# Patient Record
Sex: FEMALE | URBAN - METROPOLITAN AREA
[De-identification: names, ages, dates, MRNs, and addresses within clinical notes are randomized per-mention and may not be internally consistent; named-entity substitution may affect disease eponyms.]

---

## 2022-03-13 ENCOUNTER — NURSE TRIAGE (OUTPATIENT)
Dept: ADMINISTRATIVE | Facility: CLINIC | Age: 37
End: 2022-03-13

## 2022-03-13 NOTE — TELEPHONE ENCOUNTER
"Pt calling that is 12 weeks pregnant and has a yeast infection wanting to know if can use monistat. Pts care advice is to see Md within three days in the office and pt said that she will call on Tues for and appt   Reason for Disposition   [1] Symptoms of a "yeast infection" (i.e., itchy, white discharge, not bad smelling) AND [2] not improved > 3 days following CARE ADVICE    Additional Information   Negative: [1] Pregnant 23 or more weeks AND [2] baby is moving less today (e.g., kick count < 5 in 1 hour or < 10 in 2 hours)   Negative: Patient sounds very sick or weak to the triager   Negative: [1] Constant abdominal pain AND [2] present > 2 hours   Negative: [1] Intermittent lower abdominal pain AND [2] present > 24 hours   Negative: [1] Pregnant 24-36 weeks () AND [2] pinkish or brownish mucous discharge   Negative: [1] Yellow or green vaginal discharge AND [2] fever   Negative: Painful rash with tiny water blisters   Negative: [1] Rash (e.g., redness, tiny bumps, sore) of genital area AND [2] present > 24 hours   Negative: Abnormal color vaginal discharge (i.e., yellow, green, gray)   Negative: Bad smelling vaginal discharge   Negative: Tender lump (swelling or "ball") at vaginal opening    Protocols used: PREGNANCY - VAGINAL [id number]    "